# Patient Record
Sex: FEMALE | Race: WHITE
[De-identification: names, ages, dates, MRNs, and addresses within clinical notes are randomized per-mention and may not be internally consistent; named-entity substitution may affect disease eponyms.]

---

## 2019-01-27 ENCOUNTER — HOSPITAL ENCOUNTER (EMERGENCY)
Dept: HOSPITAL 14 - H.ER | Age: 44
Discharge: HOME | End: 2019-01-27
Payer: SELF-PAY

## 2019-01-27 VITALS — RESPIRATION RATE: 18 BRPM | HEART RATE: 79 BPM | DIASTOLIC BLOOD PRESSURE: 66 MMHG | SYSTOLIC BLOOD PRESSURE: 118 MMHG

## 2019-01-27 VITALS — TEMPERATURE: 97.7 F | OXYGEN SATURATION: 99 %

## 2019-01-27 DIAGNOSIS — R51: ICD-10-CM

## 2019-01-27 DIAGNOSIS — H11.30: Primary | ICD-10-CM

## 2019-01-27 NOTE — ED PDOC
HPI: Eye Injury/Pain


Time Seen by Provider: 01/27/19 11:31


Chief Complaint (Nursing): Eye Problem


Chief Complaint (Provider): Right Eye Redness 


History Per: Patient


History/Exam Limitations: no limitations


Onset/Duration Of Symptoms: Days (2)


Current Symptoms Are (Timing): Still Present


Associated Symptoms: denies: Decreased Vision, Itching, Discharge From Eye


Additional Complaint(s): 


43 year old male presents to the ED for an evaluation of redness to her right 

eye since Friday. Patient reports she has a headache. She took Motrin without r

elief. Otherwise, she denies blurry vision, drainage or foreign body. 


PMD: No family provider








Past Medical History


Reviewed: Historical Data, Nursing Documentation, Vital Signs


Vital Signs: 


                                Last Vital Signs











Temp  97.7 F   01/27/19 11:15


 


Pulse  76   01/27/19 11:15


 


Resp  14   01/27/19 11:15


 


BP  120/51 L  01/27/19 11:15


 


Pulse Ox  99   01/27/19 11:15














- Medical History


PMH: No Chronic Diseases





- Surgical History


Surgical History: Cholecystectomy





- Family History


Family History: States: Unknown Family Hx





- Social History


Current smoker - smoking cessation education provided: No


Alcohol: Social


Drugs: Denies





- Home Medications


Home Medications: 


                                Ambulatory Orders











 Medication  Instructions  Recorded


 


Acetaminophen with Codeine 1 tab PO Q6H PRN #10 tab 01/27/19





[Tylenol with Codeine No. 3 300  





mg-30 mg]  














- Allergies


Allergies/Adverse Reactions: 


                                    Allergies











Allergy/AdvReac Type Severity Reaction Status Date / Time


 


No Known Allergies Allergy   Verified 01/27/19 11:28














Review of Systems


ROS Statement: Except As Marked, All Systems Reviewed And Found Negative


Constitutional: Negative for: Fever


Eyes: Positive for: Redness.  Negative for: Vision Change


Respiratory: Negative for: Cough


Skin: Negative for: Rash


Neurological: Positive for: Headache





Physical Exam





- Reviewed


Nursing Documentation Reviewed: Yes


Vital Signs Reviewed: Yes





- Physical Exam


Appears: Positive for: Well, Non-toxic, No Acute Distress


Head Exam: Positive for: ATRAUMATIC, NORMAL INSPECTION, NORMOCEPHALIC


Skin: Positive for: Normal Color, Warm, Dry.  Negative for: Rash


Eye Exam: Positive for: Normal appearance, EOMI, PERRL, Other (subconjunctival 

hemorrhage to right medial eye)


ENT: Positive for: Normal ENT Inspection


Neck: Positive for: Normal, Painless ROM, Supple.  Negative for: Decreased ROM


Cardiovascular/Chest: Positive for: Regular Rate, Rhythm.  Negative for: Murmur


Respiratory: Positive for: Normal Breath Sounds.  Negative for: Decreased Breath

Sounds, Respiratory Distress


Gastrointestinal/Abdominal: Positive for: Normal Exam, Soft.  Negative for: 

Tenderness, Guarding, Rebound


Back: Positive for: Normal Inspection


Extremity: Positive for: Normal ROM.  Negative for: Tenderness, Pedal Edema, 

Deformity


Neurologic/Psych: Positive for: Alert, Oriented (x3).  Negative for: 

Motor/Sensory Deficits





- ECG


O2 Sat by Pulse Oximetry: 99 (RA)


Pulse Ox Interpretation: Normal





Medical Decision Making


Medical Decision Making: 


Time: 1134 


Initial Plan:


Head w/o Contrast CT 


ED urine pregnancy 


Tylenol/Codeine 300mg/30mg 


Reevaluation 





Time: 1412


PROCEDURE:  CT HEAD WITHOUT CONTRAST


FINDINGS:


HEMORRHAGE:


No intracranial hemorrhage. 


BRAIN:


No mass effect or edema.  No atrophy or chronic microvascular ischemic changes.


VENTRICLES:


Unremarkable. No hydrocephalus. 


CALVARIUM:


Unremarkable.


PARANASAL SINUSES:


Unremarkable as visualized. No significant inflammatory changes.


MASTOID AIR CELLS:


Unremarkable as visualized. No inflammatory changes.


OTHER FINDINGS:


None.


IMPRESSION:


Intracranial hemorrhage.








 

--------------------------------------------------------------------------------


----------------- 


Scribe Attestation:


Documented by Memo Olivier, acting as a scribe for Shari Covarrubias MD.





Provider Scribe Attestation:


All medical record entries made by the Scribe were at my direction and 

personally dictated by me. I have reviewed the chart and agree that the record 

accurately reflects my personal performance of the history, physical exam, 

medical decision making, and the department course for this patient. I have also

personally directed, reviewed, and agree with the discharge instructions and 

disposition.











Disposition





- Clinical Impression


Clinical Impression: 


 Subconjunctival hemorrhage, Headache








- Disposition


Referrals: 


[facility] [Outside]


Disposition: Routine/Home


Disposition Time: 14:42


Condition: STABLE


Prescriptions: 


Acetaminophen with Codeine [Tylenol with Codeine No. 3 300 mg-30 mg] 1 tab PO 

Q6H PRN #10 tab


 PRN Reason: Pain, Severe (8-10)


Instructions:  Headache, Adult, Subconjunctival Hemorrhage


Forms:  [facility] (Yoruba)


Print Language: Hungarian

## 2019-01-27 NOTE — CT
Date of service: 



2019-01-27 13:50:58



PROCEDURE:  CT HEAD WITHOUT CONTRAST.



HISTORY:

R sided HA



COMPARISON:

No for now prior available for comparison. 



TECHNIQUE:

Axial computed tomography images were obtained through the head/brain 

without intravenous contrast.  



Radiation dose:



Total exam DLP = 792.71 mGy-cm.



This CT exam was performed using one or more of the following dose 

reduction techniques: Automated exposure control, adjustment of the 

mA and/or kV according to patient size, and/or use of iterative 

reconstruction technique.



FINDINGS:



HEMORRHAGE:

No intracranial hemorrhage. 



BRAIN:

No mass effect or edema.  No atrophy or chronic microvascular 

ischemic changes.



VENTRICLES:

Unremarkable. No hydrocephalus. 



CALVARIUM:

Unremarkable.



PARANASAL SINUSES:

Unremarkable as visualized. No significant inflammatory changes.



MASTOID AIR CELLS:

Unremarkable as visualized. No inflammatory changes.



OTHER FINDINGS:

None.



IMPRESSION:

Intracranial hemorrhage.

## 2019-02-24 ENCOUNTER — HOSPITAL ENCOUNTER (EMERGENCY)
Dept: HOSPITAL 14 - H.ER | Age: 44
LOS: 1 days | Discharge: HOME | End: 2019-02-25
Payer: COMMERCIAL

## 2019-02-24 VITALS — RESPIRATION RATE: 18 BRPM

## 2019-02-24 DIAGNOSIS — R10.2: Primary | ICD-10-CM

## 2019-02-24 DIAGNOSIS — D25.9: ICD-10-CM

## 2019-02-24 LAB
BACTERIA #/AREA URNS HPF: (no result) /[HPF]
BASOPHILS # BLD AUTO: 0 K/UL (ref 0–0.2)
BASOPHILS NFR BLD: 0.3 % (ref 0–2)
BILIRUB UR-MCNC: NEGATIVE MG/DL
BUN SERPL-MCNC: 12 MG/DL (ref 7–17)
CALCIUM SERPL-MCNC: 9.2 MG/DL (ref 8.4–10.2)
COLOR UR: YELLOW
EOSINOPHIL # BLD AUTO: 0.1 K/UL (ref 0–0.7)
EOSINOPHIL NFR BLD: 1.5 % (ref 0–4)
ERYTHROCYTE [DISTWIDTH] IN BLOOD BY AUTOMATED COUNT: 14.7 % (ref 11.5–14.5)
GFR NON-AFRICAN AMERICAN: > 60
GLUCOSE UR STRIP-MCNC: (no result) MG/DL
HGB BLD-MCNC: 12.6 G/DL (ref 12–16)
LEUKOCYTE ESTERASE UR-ACNC: (no result) LEU/UL
LYMPHOCYTES # BLD AUTO: 1.7 K/UL (ref 1–4.3)
LYMPHOCYTES NFR BLD AUTO: 17.5 % (ref 20–40)
MCH RBC QN AUTO: 28.6 PG (ref 27–31)
MCHC RBC AUTO-ENTMCNC: 32.9 G/DL (ref 33–37)
MCV RBC AUTO: 86.9 FL (ref 81–99)
MONOCYTES # BLD: 0.8 K/UL (ref 0–0.8)
MONOCYTES NFR BLD: 7.8 % (ref 0–10)
NEUTROPHILS # BLD: 7 K/UL (ref 1.8–7)
NEUTROPHILS NFR BLD AUTO: 72.9 % (ref 50–75)
NRBC BLD AUTO-RTO: 0.1 % (ref 0–0)
PH UR STRIP: 6 [PH] (ref 5–8)
PLATELET # BLD: 166 K/UL (ref 130–400)
PMV BLD AUTO: 11.3 FL (ref 7.2–11.7)
PROT UR STRIP-MCNC: 30 MG/DL
RBC # BLD AUTO: 4.41 MIL/UL (ref 3.8–5.2)
RBC # UR STRIP: (no result) /UL
SP GR UR STRIP: 1.02 (ref 1–1.03)
SQUAMOUS EPITHIAL: 5 /HPF (ref 0–5)
URINE CLARITY: (no result)
UROBILINOGEN UR-MCNC: 2 MG/DL (ref 0.2–1)
WBC # BLD AUTO: 9.6 K/UL (ref 4.8–10.8)

## 2019-02-24 PROCEDURE — 96372 THER/PROPH/DIAG INJ SC/IM: CPT

## 2019-02-24 PROCEDURE — 86850 RBC ANTIBODY SCREEN: CPT

## 2019-02-24 PROCEDURE — 81025 URINE PREGNANCY TEST: CPT

## 2019-02-24 PROCEDURE — 99284 EMERGENCY DEPT VISIT MOD MDM: CPT

## 2019-02-24 PROCEDURE — 86900 BLOOD TYPING SEROLOGIC ABO: CPT

## 2019-02-24 PROCEDURE — 81003 URINALYSIS AUTO W/O SCOPE: CPT

## 2019-02-24 PROCEDURE — 85025 COMPLETE CBC W/AUTO DIFF WBC: CPT

## 2019-02-24 PROCEDURE — 87086 URINE CULTURE/COLONY COUNT: CPT

## 2019-02-24 PROCEDURE — 76830 TRANSVAGINAL US NON-OB: CPT

## 2019-02-24 PROCEDURE — 80048 BASIC METABOLIC PNL TOTAL CA: CPT

## 2019-02-24 NOTE — ED PDOC
HPI: Female  Pain


Time Seen by Provider: 02/24/19 19:08


Chief Complaint (Nursing): Female Genitourinary


Chief Complaint (Provider): vaginal bleed x 1 month


History Per: Patient


History/Exam Limitations: no limitations


Additional Complaint(s): 


44 y/o F with hx of uterine fibroids s/p myomectomy in 11/2018 who presents with

vaginal beeding x 1 month. STates that it has been on and off for the past month

but for the past week she has had a dark discharge and developed pelvic pain and

vaginal discomfort so much so that it hurts to sit on the toilet. She is having 

some dizziness but denies N/V, diarrhea. 





Past Medical History


Reviewed: Historical Data, Nursing Documentation, Vital Signs


Vital Signs: 





                                Last Vital Signs











Temp  98 F   02/24/19 18:22


 


Pulse  70   02/24/19 18:22


 


Resp  18   02/24/19 18:22


 


BP  121/67   02/24/19 18:22


 


Pulse Ox  100   02/24/19 18:22














- Medical History


Other PMH: uterine fibroids





- Surgical History


Surgical History: Cholecystectomy


Other surgeries: uterine myomectomy





- Family History


Family History: States: Unknown Family Hx





- Home Medications


Home Medications: 


                                Ambulatory Orders











 Medication  Instructions  Recorded


 


Acetaminophen with Codeine 1 tab PO Q6H PRN #10 tab 01/27/19





[Tylenol with Codeine No. 3 300  





mg-30 mg]  














- Allergies


Allergies/Adverse Reactions: 


                                    Allergies











Allergy/AdvReac Type Severity Reaction Status Date / Time


 


No Known Allergies Allergy   Verified 02/24/19 18:22














Review of Systems


Constitutional: Negative for: Fever, Chills


Cardiovascular: Negative for: Palpitations


Gastrointestinal: Negative for: Nausea, Vomiting, Abdominal Pain, Diarrhea


Genitourinary Female: Positive for: Vaginal Discharge, Vaginal Bleeding, Pelvic 

Pain.  Negative for: Dysuria, Frequency, Hematuria


Neurological: Positive for: Dizziness





Physical Exam





- Reviewed


Nursing Documentation Reviewed: Yes


Vital Signs Reviewed: Yes





- Physical Exam


Appears: Positive for: Uncomfortable


Skin: Positive for: Normal Color


Gastrointestinal/Abdominal: Positive for: Tenderness (RLQ, LLQ and suprapubic 

tenderness)


Pelvic Exam: Positive for: No Cerv. Motion Tender, Blood (dark blood noted in 

vaginal vault on speculum exam. Pt exquisitely tender on speculum exam and 

refused to allow for completion due to pain. ), Tender Adnexa (B/L on bimanual 

exam), Tender Uterus, Other (performed in the presence of ED technician Evelyn 

as chaperone. Unable to visualize cervix as pt refused to continue due to pain. 

).  Negative for: No Masses, Lesions


Neurologic/Psych: Positive for: Alert, Oriented





- Laboratory Results


Result Diagrams: 


                                 02/24/19 20:19





                                 02/24/19 20:19





- ECG


O2 Sat by Pulse Oximetry: 100





Medical Decision Making


Medical Decision Making: 


CBC, CMP, Type and screen


Transvaginal U/S


URine dip, U/A, urine culture


Toradol 30mg IM x 1





20:00: pt endorsed to [facility] pending lab results and Transvaginal U/S. 








Disposition





- Clinical Impression


Clinical Impression: 


 Genitourinary Pain








- Patient ED Disposition


Is Patient to be Admitted: Transfer of Care





- Disposition


Disposition: Transfer of Care ([facility])


Disposition Time: 20:00


Condition: FAIR


Forms:  [facility] (English)

## 2019-02-24 NOTE — ED PDOC
- Laboratory Results


Result Diagrams: 


                                 02/24/19 20:19





                                 02/24/19 20:19


Lab Results: 





                                        











Urine Color  Yellow  (YELLOW)   02/24/19  20:19    


 


Urine Clarity  Cloudy  (Clear)   02/24/19  20:19    


 


Urine pH  6.0  (5.0-8.0)   02/24/19  20:19    


 


Ur Specific Gravity  1.025  (1.003-1.030)   02/24/19  20:19    


 


Urine Protein  30 mg/dL (NEGATIVE)   02/24/19  20:19    


 


Urine Glucose (UA)  Neg mg/dL (NEGATIVE)   02/24/19  20:19    


 


Urine Ketones  Negative mg/dL (NEGATIVE)   02/24/19  20:19    


 


Urine Blood  Large  (NEGATIVE)   02/24/19  20:19    


 


Urine Nitrate  Negative  (NEGATIVE)   02/24/19  20:19    


 


Urine Bilirubin  Negative  (NEGATIVE)   02/24/19  20:19    


 


Urine Urobilinogen  2.0 mg/dL (0.2-1.0)  H  02/24/19  20:19    


 


Ur Leukocyte Esterase  Large Brandy/uL (Negative)   02/24/19  20:19    


 


Urine RBC (Auto)  1765 /hpf (0-3)  H  02/24/19  20:19    


 


Urine Microscopic WBC  372 /hpf (0-5)  H  02/24/19  20:19    


 


Ur Squamous Epith Cells  5 /hpf (0-5)   02/24/19  20:19    


 


Urine Bacteria  Rare  (<OCC)   02/24/19  20:19    














- ECG


O2 Sat by Pulse Oximetry: 100





Medical Decision Making


Medical Decision Making: 





Pt signed over from Julia Caceres at 2000





22:40


US Transvaginal


Findings 


Uterus 


Measures 8.6 x 4.8 x 6.3 cm. Normal in size. Fundal fibroid measures 2.1 x 1.2 x

1.4 cm, posterior fundal fibroid measures 2.4 x 1.6 x 1.9 cm and left anterior 

fibroid measures 1.7 x 1.6 x 1.5 cm. 


Endometrium 


Measures 9 mm in diameter. Unremarkable. 


Right ovary 


Measures 2.6 x 2 x 2.2 cm. No solid mass. Normal flow. Cyst measures 1.4 x 1.3 x

1.3 cm. 


Left ovary 


Not visualized. 


Free fluid 


No significant free fluid noted. 


Other Findings 


None. 


Impression 


1.  Uterine fibroids. 


2.  Right ovarian cyst. 


3.  Left ovary not visualized.





Disposition





- Clinical Impression


Clinical Impression: 


 Genitourinary Pain, Female genitourinary symptoms, Uterine fibroid








- POA


Present On Arrival: None





- Disposition


Referrals: 


[facility] [Outside]


[facility] [Outside]


[facility] [Outside]


[facility] [Outside[facility]


Disposition: Routine/Home


Disposition Time: 01:53


Condition: STABLE


Instructions:  Uterine Fibroids, Uterine Fibroids (DC), Uterine Fibroid Removal 

(DC)


Forms:  [facility] (English), [employer] (Maltese)


Print Language: [place] citizen

## 2019-02-25 VITALS — SYSTOLIC BLOOD PRESSURE: 128 MMHG | DIASTOLIC BLOOD PRESSURE: 74 MMHG | HEART RATE: 58 BPM | TEMPERATURE: 98 F

## 2019-02-25 VITALS — OXYGEN SATURATION: 100 %

## 2019-02-25 NOTE — US
Date of service: 



02/24/2019



HISTORY:

Pelvic pain.  Bleeding 1 month duration. 



COMPARISON:

07/21/2013 pelvic ultrasound.  Solitary anterior uterine fibroid 

measured 2.9 x 3.2 x 3 cm. 



TECHNIQUE:

Transvaginal only. Real -time technique with 2D, duplex and color 

Doppler



FINDINGS:



UTERUS:

Measures 4.8 x 6.3 x 8.6 cm. Normal in size, heterogeneous echo 

characteristics. 



1. Fundal fibroid, midline and sub serosal 2.1 x 1.2 cm. 



2. Posterior midline fibroid 1.6 x 2.4 cm. 



3. Anterior sub serosal fibroid to the left of the midline 1 x 1.6 

cm. 



ENDOMETRIUM:

Measures 9.4 mm in diameter. No ultrasound findings to suggest 

gestational sac, fluid, debris, mass or polyp or other pathologic 

process within the endometrium. 



CERVIX:

No cervical abnormality identified.



RIGHT OVARY:

Measures 2 x 2.2 x 2.6 cm. No solid mass. Normal flow. Simple cyst 

1.3 x 1.3 x 1.4 cm.



LEFT OVARY:

Not visible. 



FREE FLUID:

No significant free fluid noted.



OTHER FINDINGS:

None. 



IMPRESSION:

Heterogeneous uterus containing 3 small uterine fibroids.  The fundal 

fibroid apparent on the current study is diminished in size compared 

to the prior study.  The 2 additional fibroids were not seen on the 

prior examination. 



Simple cyst right adnexa.



Limitations of the current study: Non visible left adnexa.



___________________________________________________



Concordant findings (preliminary report) provided by [facility].

## 2019-03-24 ENCOUNTER — HOSPITAL ENCOUNTER (EMERGENCY)
Dept: HOSPITAL 14 - H.ER | Age: 44
Discharge: HOME | End: 2019-03-24
Payer: COMMERCIAL

## 2019-03-24 VITALS — OXYGEN SATURATION: 100 %

## 2019-03-24 VITALS
SYSTOLIC BLOOD PRESSURE: 129 MMHG | HEART RATE: 66 BPM | TEMPERATURE: 98.2 F | RESPIRATION RATE: 18 BRPM | DIASTOLIC BLOOD PRESSURE: 88 MMHG

## 2019-03-24 DIAGNOSIS — M25.561: ICD-10-CM

## 2019-03-24 DIAGNOSIS — M17.11: Primary | ICD-10-CM

## 2019-03-24 PROCEDURE — 81025 URINE PREGNANCY TEST: CPT

## 2019-03-24 PROCEDURE — 96372 THER/PROPH/DIAG INJ SC/IM: CPT

## 2019-03-24 PROCEDURE — 73562 X-RAY EXAM OF KNEE 3: CPT

## 2019-03-24 PROCEDURE — 99284 EMERGENCY DEPT VISIT MOD MDM: CPT

## 2019-03-24 NOTE — ED PDOC
Lower Extremity Pain/Injury


Time Seen by Provider: 19 17:53


Chief Complaint (Nursing): Lower Extremity Problem/Injury


Chief Complaint (Provider): Right Knee Pain 


History Per: Patient,  ([id number])


History/Exam Limitations: no limitations


Onset/Duration Of Symptoms: Days (x3 weeks)


Current Symptoms Are (Timing): Still Present


Pain Scale Rating Of: 10


Additional Complaint(s): 





Patient is a 43 year old female who presents to the ED for evaluation of 

atraumatic right knee pain ongoing x3 weeks however worsened this morning. Cathy

ent reports the pain is localized, 10/10, and described as sharp. Pain worsens 

with movement. Patient reports she is now limping as a result of the pain. 

Patient last took Tylenol at 10am with no relief of symptoms. No history of 

similar symptoms; no history of knee injury or surgery. Patient has no other 

complaints and denies fever.





PMD: clinic


LMP: 1 month ago





Past Medical History


Reviewed: Historical Data, Nursing Documentation, Vital Signs


Vital Signs: 





                                Last Vital Signs











Temp  98.4 F   19 17:47


 


Pulse  62   19 17:47


 


Resp  16   19 17:47


 


BP  133/79   19 17:47


 


Pulse Ox  100   19 17:47














- Medical History


PMH: No Chronic Diseases





- Surgical History


Surgical History: Cholecystectomy


Other surgeries: Tubal Ligation





- Family History


Family History: States: Unknown Family Hx





- Social History


Current smoker - smoking cessation education provided: No


Alcohol: None


Drugs: Denies





- Home Medications


Home Medications: 


                                Ambulatory Orders











 Medication  Instructions  Recorded


 


Acetaminophen with Codeine 1 tab PO Q6H PRN #10 tab 19





[Tylenol with Codeine No. 3 300  





mg-30 mg]  


 


Acetaminophen [Acetaminophen 8 650 mg PO Q8 PRN #21 tablet.er 19





Hour]  


 


Naproxen 500 mg PO BID PRN #20 tab 19














- Allergies


Allergies/Adverse Reactions: 


                                    Allergies











Allergy/AdvReac Type Severity Reaction Status Date / Time


 


No Known Allergies Allergy   Verified 19 17:47














Review of Systems


ROS Statement: Except As Marked, All Systems Reviewed And Found Negative


Musculoskeletal: Positive for: Other (knee pain - right)





Physical Exam





- Reviewed


Nursing Documentation Reviewed: Yes


Vital Signs Reviewed: Yes





- Physical Exam


Comments: 


GENERALIZED APPEARANCE:Patient is awake, alert, oriented x3 in no acute 

distress. Resting comfortably. 


SKIN: Warm, dry; (-) cyanosis.


NECK: Supple, FROM


LOWER EXTREMITY:Diffuse tenderness of right knee with limited ROM secondary to 

pain (-) effusion (-) erythema (-) warmth (-) skin break. Achilles tendon intact

and nontender. Ankle and foot: (-) injury with FROM. (-) calf tenderness. 

Sensation intact throughout. Strength to lower extremities symmetric. 


CARDIOVASCULAR: (+) distal pulse.


NEUROLOGIC: (+) distal sensation.


CHEST AND RESPIRATORY: (-) wheezing; (-) rales, (-) rhonchi; breath sounds 

equal bilaterally. Respirations nonlabored. 


HEART AND CARDIOVASCULAR: (-) irregularity





- Laboratory Results


Urine Pregnancy POC: Negative





- ECG


O2 Sat by Pulse Oximetry: 100 (RA)


Pulse Ox Interpretation: Normal





Medical Decision Making


Medical Decision Makin


Initial Impression: acute knee pain


Plan:


-Upreg


-Toradol 30mg IM


-Acetaminophen 650mg PO


-Knee XR 3 views 


-Re-evaluation 








XR reviewed, no acute disease as read by Claudia SZYMANSKI.





On re-evaluation, patient reports improvement of symptoms. On exam, patient 

remains AAOx3, in no acute distress. Vitals stable. RICE encouraged.


Lab/Diagnostic results d/w the patient in great detail. Diagnosis of acute knee 

pain/sprain, to consider OA d/w the patient. 


Based on history, exam and diagnostic results, plan will be for outpatient 

follow up with PMD/ortho. 


Patient instructed to follow-up with pmd / referral provided / the clinic  in 1-

2 days without fail. Advised to take medication as prescribed. Return to the 

emergency room at any time for any new or worsening symptoms. Patient states she

fully agrees with and understands discharge instructions. States that she agrees

with the plan and disposition. Verbalized and repeated discharge instructions 

and plan. I have given the patient opportunity to ask any additional questions.





Disposition





- Clinical Impression


Clinical Impression: 


 Acute knee pain, Knee sprain, Osteoarthritis








- Patient ED Disposition


Is Patient to be Admitted: No


Counseled Patient/Family Regarding: Studies Performed, Diagnosis, Need For 

Followup, Rx Given





- Disposition


Referrals: 


Claribel Garcia MD [Staff Provider] - 


[facility] [Outside]


Disposition: Routine/Home


Disposition Time: 19:00


Condition: STABLE


Additional Instructions: 


La atencin mdica de emergencia que recibi hoy se dirigi a beth sntomas 

agudos. Si le recetaron algn medicamento, llnelo y tmelo segn las 

indicaciones. Los sntomas pueden tardar varios yen en resolverse. Regrese al 

Departamento de Emergencias si beth sntomas empeoran, no mejoran o si tiene 

otros problemas.





Comunquese con zheng mdico dentro de 2 yen para omar nueva evaluacin y luis un 

seguimiento o llame a zahira de los mdicos / clnicas a los que ha sido referido y

 que figuran en el formulario de Informacin de visita al paciente que se 

incluye en zheng paquete de sandro. Lleve todos los documentos que le entregaron al 

momento del sandro junto con todos los medicamentos que est tomando para zheng 

visita de seguimiento. Nuestro tratamiento no puede reemplazar la atencin 

mdica continua por parte de un proveedor de atencin primaria (PCP) fuera del 

departamento de emergencias.


Prescriptions: 


Acetaminophen [Acetaminophen 8 Hour] 650 mg PO Q8 PRN #21 tablet.er


 PRN Reason: Pain, Moderate (4-7)


Naproxen 500 mg PO BID PRN #20 tab


 PRN Reason: Pain, Moderate (4-7)


Instructions:  Osteoarthritis, Knee Sprain (DC), Knee Pain


Forms:  [facility] (Lithuanian)


Print Language: Bengali





- POA


Present On Arrival: None

## 2019-03-25 NOTE — RAD
Date of service: 



03/24/2019



PROCEDURE:  Right Knee Radiographs.



HISTORY:

joint pain



COMPARISON:

None.



FINDINGS:



BONES:

Normal. No fracture. 



JOINTS:

Normal. No osteoarthritis. 



JOINT EFFUSION:

None. 



OTHER FINDINGS:

None.



IMPRESSION:

No evidence of acute pathology.

## 2019-04-20 ENCOUNTER — HOSPITAL ENCOUNTER (EMERGENCY)
Dept: HOSPITAL 14 - H.ER | Age: 44
LOS: 1 days | Discharge: HOME | End: 2019-04-21
Payer: SELF-PAY

## 2019-04-20 VITALS — RESPIRATION RATE: 16 BRPM

## 2019-04-20 DIAGNOSIS — M25.50: ICD-10-CM

## 2019-04-20 DIAGNOSIS — M79.7: ICD-10-CM

## 2019-04-20 DIAGNOSIS — M79.606: Primary | ICD-10-CM

## 2019-04-20 PROCEDURE — 99284 EMERGENCY DEPT VISIT MOD MDM: CPT

## 2019-04-20 PROCEDURE — 93971 EXTREMITY STUDY: CPT

## 2019-04-20 PROCEDURE — 81025 URINE PREGNANCY TEST: CPT

## 2019-04-20 PROCEDURE — 96372 THER/PROPH/DIAG INJ SC/IM: CPT

## 2019-04-20 PROCEDURE — 73562 X-RAY EXAM OF KNEE 3: CPT

## 2019-04-20 NOTE — ED PDOC
Lower Extremity Pain/Injury


Time Seen by Provider: 04/20/19 23:02


Chief Complaint (Nursing): Lower Extremity Problem/Injury


Chief Complaint (Provider): Lower Extremity Problem/Injury


History Per: Patient


History/Exam Limitations: no limitations


Onset/Duration Of Symptoms: Days


Current Symptoms Are (Timing): Still Present


Additional Complaint(s): 


44 y/o female with no significant PMHx presents to the ED for evaluation of body

aches. Patient reports symptoms started on March 24th when she was initially 

evaluated in this ER for complaints of leg pain. Patient states that since then,

she has been experiencing constant, chronic pain. Patient notes that since last 

visit, she saw her PMD who gave her a prescription for pain. Patient states she 

was given Percocet, Flexeril and Prenisone. Patient reports she medications did 

not help much but pain has worsened since running out of them yesterday. Patient

notes of having leg pain (right > left), lower & upper back pain, neck pain and 

right shoulder pain. Patient states pain to the right shoulder has been 

gradually worsening over the last week. Otherwise, patient reports she has not 

taken any other medications for pain relief since running out of prescription 

medications yesterday. Patient additionally denies fever, chest pain, numbness 

and weakness. 








PMD: no provider





Past Medical History


Reviewed: Historical Data, Nursing Documentation, Vital Signs


Vital Signs: 





                                Last Vital Signs











Temp  98.8 F   04/20/19 23:00


 


Pulse  69   04/20/19 23:00


 


Resp  16   04/20/19 23:00


 


BP  114/76   04/20/19 23:00


 


Pulse Ox  98   04/20/19 23:00














- Medical History


PMH: No Chronic Diseases





- Surgical History


Surgical History: Cholecystectomy


Other surgeries: tubal ligation





- Family History


Family History: States: Unknown Family Hx





- Immunization History


Hx Tetanus Toxoid Vaccination: No


Hx Influenza Vaccination: No


Hx Pneumococcal Vaccination: No





- Home Medications


Home Medications: 


                                Ambulatory Orders











 Medication  Instructions  Recorded


 


Acetaminophen with Codeine 1 tab PO Q6H PRN #10 tab 01/27/19





[Tylenol with Codeine No. 3 300  





mg-30 mg]  


 


Acetaminophen [Acetaminophen 8 650 mg PO Q8 PRN #21 tablet.er 03/24/19





Hour]  


 


Naproxen 500 mg PO BID PRN #20 tab 03/24/19














- Allergies


Allergies/Adverse Reactions: 


                                    Allergies











Allergy/AdvReac Type Severity Reaction Status Date / Time


 


No Known Allergies Allergy   Verified 03/24/19 17:47














Review of Systems


ROS Statement: Except As Marked, All Systems Reviewed And Found Negative


Constitutional: Positive for: Other (body aches)


Musculoskeletal: Positive for: Neck Pain, Shoulder Pain, Back Pain, Leg Pain





Physical Exam





- Reviewed


Nursing Documentation Reviewed: Yes


Vital Signs Reviewed: Yes





- Physical Exam


Appears: Positive for: No Acute Distress (but obese)


Head Exam: Positive for: ATRAUMATIC, NORMOCEPHALIC


Skin: Positive for: Normal Color, Warm, Dry


Eye Exam: Positive for: Normal appearance, EOMI, PERRL


Neck: Positive for: Normal, Painless ROM, Supple


Cardiovascular/Chest: Positive for: Regular Rate, Rhythm.  Negative for: Murmur


Respiratory: Positive for: Normal Breath Sounds.  Negative for: Accessory Muscle

Use, Respiratory Distress


Gastrointestinal/Abdominal: Positive for: Normal Exam, Soft.  Negative for: 

Tenderness


Back: Positive for: Other (paraspinal tenderness to palpation to the lower back)


Extremity: Positive for: Calf Tenderness (right calf tenderness to palpation).  

Negative for: Normal ROM (Limited ROM secondary to pain of the right knee. 

Normal ROM of the right shoulder), Deformity (to the bilateral knees), Swelling 

(or erythema to the bilateral knees. )


Neurological/Psych: Positive for: Awake, Alert, Oriented (x3).  Negative for: 

Motor/Sensory Deficits





- ECG


O2 Sat by Pulse Oximetry: 98 (RA)


Pulse Ox Interpretation: Normal





- Progress


Re-evaluation Time: 01:24


Condition: Re-examined, Improved





Medical Decision Making


Medical Decision Making: 


Time: 2321


Impression: Body aches, myalgia and arthralgia 


Differentials include but not limited to arthritis, rheumatoid disease and 

fibromyalgia


Rule out DVT


Plan:


-- Knee 3 Views RT XR


-- Percoet 5/325 mg 1 tab PO


-- Toradol 30 mg IM


-- US Duplex Lower Extrem Vein Right





Time: 0006


-- Right Knee XR as read by me demonstrates no acute findings. 





Time: 0013


EXAM: 


US for Deep Venous Thrombosis, right Lower Extremity. 





CLINICAL HISTORY: 


Rt leg pain x 3 weeks. 





TECHNIQUE: 


Real-time ultrasound scan of the veins of the right lower extremity with color 

Doppler flow, spectral waveform analysis and compression. 





COMPARISON: 


None provided. 





FINDINGS: 





DEEP VEINS: 


The common femoral, superficial femoral, and popliteal veins are echolucent and 

compressible. 


There is normal color Doppler flow throughout. 


The visualized calf veins appear patent. 





SUPERFICIAL VEINS: 


The visualized greater saphenous vein is patent. 





SOFT TISSUES: 


No popliteal fossa cyst or other abnormalities. 





IMPRESSION: 





No deep venous thrombosis evident on right lower extremity examination.


 


Electronically signed on Apr 21, 2019 12:13:00 AM EDT by:


Matt Fitzpatrick M.D., M.B.A., Certified By [facility]


Fellowship Trained MRI and CT Specialist


 


 

________________________________________________________________________________


________________________


Scribe Attestation:


Documented by Wilman Israel, acting as a scribe Dane Perez MD.





Provider Scribe Attestation:


All medical record entries made by the Scribe were at my direction and 

personally dictated by me. I have reviewed the chart and agree that the record 

accurately reflects my personal performance of the history, physical exam, 

medical decision making, and the department course for this patient. I have also

personally directed, reviewed, and agree with the discharge instructions and 

disposition.





Disposition





- Clinical Impression


Clinical Impression: 


 Arthralgia, Leg pain, Myalgia








- Patient ED Disposition


Is Patient to be Admitted: No


Doctor Will See Patient In The: Office


Counseled Patient/Family Regarding: Studies Performed, Diagnosis, Need For 

Followup





- Disposition


Referrals: 


Alejandro Haskins MD [Staff Provider] - 


Tai George MD [Staff Provider] - 


Disposition: Routine/Home


Disposition Time: 01:25


Condition: GOOD


Additional Instructions: 





ARTURO MARTIN, thank you for letting us take care of you today. Your 

provider was Matt Perez MD and you were treated for RIGHT LEG PAIN. The

emergency medical care you received today was directed at your acute symptoms. 

If you were prescribed any medication, please fill it and take as directed. It 

may take several days for your symptoms to resolve. Return to the Emergency 

Department if your symptoms worsen, do not improve, or if you have any other 

problems.





Please contact your doctor or call one of the physicians/clinics you have been 

referred to that are listed on the Patient Visit Information form that is 

included in your discharge packet. Bring any paperwork you were given at 

discharge with you along with any medications you are taking to your follow up 

visit. Our treatment cannot replace ongoing medical care by a primary care 

provider outside of the emergency department.





Thank you for allowing the [facility] team to be part of your care today.








If you had an X-Ray or CT scan: A Radiologist will review the ED reading if any 

change in treatment is needed we will contact you.***





Instructions:  Muscle and Bone Pain (DC)


Print Language: Guyanese

## 2019-04-21 VITALS
TEMPERATURE: 98.7 F | HEART RATE: 70 BPM | OXYGEN SATURATION: 99 % | DIASTOLIC BLOOD PRESSURE: 55 MMHG | SYSTOLIC BLOOD PRESSURE: 103 MMHG

## 2019-04-21 NOTE — RAD
Date of service: 



04/20/2019



PROCEDURE:  Right Knee Radiographs.



HISTORY:

right knee pain



COMPARISON:

None.



TECHNIQUE:

2 views obtained.



FINDINGS:



BONES:

Normal. No fracture. 



JOINTS:

No significant joint space narrowing.  Minimal spurring of the tibial 

spine region.  No loose body is seen.  No focal osteochondral defect 

is identified.  No periosteal reaction is seen. 



JOINT EFFUSION:

None. 



OTHER FINDINGS:

None.



IMPRESSION:

Unremarkable two-view examination of the right knee.

## 2019-04-21 NOTE — US
Date of service: 



04/20/2019



PROCEDURE:  Right lower extremity venous duplex Doppler. 



HISTORY:

right lower leg pain







COMPARISON:

None available.



TECHNIQUE:

Common femoral, superficial femoral, popliteal and posterior tibial 

veins were evaluated. Flow was assessed with color Doppler, 

compressibility, assessment of phasic flow and augmentation response. 



FINDINGS:



COMMON FEMORAL VEIN:

Unremarkable.



SUPERFICIAL FEMORAL VEIN:

Unremarkable.



POPLITEAL VEIN:

Unremarkable.



POSTERIOR TIBIAL VEIN:

Unremarkable.



OTHER FINDINGS:

None.



IMPRESSION:

No evidence of deep venous thrombosis in the right lower extremity.

## 2019-04-29 ENCOUNTER — HOSPITAL ENCOUNTER (EMERGENCY)
Dept: HOSPITAL 14 - H.ER | Age: 44
Discharge: HOME | End: 2019-04-29
Payer: SELF-PAY

## 2019-04-29 VITALS
SYSTOLIC BLOOD PRESSURE: 123 MMHG | OXYGEN SATURATION: 100 % | HEART RATE: 74 BPM | TEMPERATURE: 97.8 F | DIASTOLIC BLOOD PRESSURE: 70 MMHG | RESPIRATION RATE: 16 BRPM

## 2019-04-29 DIAGNOSIS — M79.7: ICD-10-CM

## 2019-04-29 DIAGNOSIS — M25.561: Primary | ICD-10-CM

## 2019-05-19 ENCOUNTER — HOSPITAL ENCOUNTER (EMERGENCY)
Dept: HOSPITAL 14 - H.ER | Age: 44
Discharge: HOME | End: 2019-05-19
Payer: SELF-PAY

## 2019-05-19 VITALS
OXYGEN SATURATION: 98 % | TEMPERATURE: 98.1 F | DIASTOLIC BLOOD PRESSURE: 51 MMHG | HEART RATE: 69 BPM | RESPIRATION RATE: 16 BRPM | SYSTOLIC BLOOD PRESSURE: 116 MMHG

## 2019-05-19 VITALS — BODY MASS INDEX: 38.8 KG/M2

## 2019-05-19 DIAGNOSIS — M79.10: ICD-10-CM

## 2019-05-19 DIAGNOSIS — N39.0: Primary | ICD-10-CM

## 2019-05-19 DIAGNOSIS — M79.7: ICD-10-CM

## 2019-05-19 LAB
BACTERIA #/AREA URNS HPF: (no result) /[HPF]
BASOPHILS # BLD AUTO: 0 K/UL (ref 0–0.2)
BASOPHILS NFR BLD: 0.4 % (ref 0–2)
BILIRUB UR-MCNC: NEGATIVE MG/DL
BUN SERPL-MCNC: 8 MG/DL (ref 7–17)
CALCIUM SERPL-MCNC: 8.7 MG/DL (ref 8.4–10.2)
COLOR UR: YELLOW
EOSINOPHIL # BLD AUTO: 0.1 K/UL (ref 0–0.7)
EOSINOPHIL NFR BLD: 1.6 % (ref 0–4)
ERYTHROCYTE [DISTWIDTH] IN BLOOD BY AUTOMATED COUNT: 14.2 % (ref 11.5–14.5)
GFR NON-AFRICAN AMERICAN: > 60
GLUCOSE UR STRIP-MCNC: (no result) MG/DL
HGB BLD-MCNC: 13.1 G/DL (ref 12–16)
LEUKOCYTE ESTERASE UR-ACNC: (no result) LEU/UL
LYMPHOCYTES # BLD AUTO: 1.6 K/UL (ref 1–4.3)
LYMPHOCYTES NFR BLD AUTO: 25.3 % (ref 20–40)
MCH RBC QN AUTO: 29 PG (ref 27–31)
MCHC RBC AUTO-ENTMCNC: 33.3 G/DL (ref 33–37)
MCV RBC AUTO: 87.2 FL (ref 81–99)
MONOCYTES # BLD: 0.5 K/UL (ref 0–0.8)
MONOCYTES NFR BLD: 8.3 % (ref 0–10)
NEUTROPHILS # BLD: 4.1 K/UL (ref 1.8–7)
NEUTROPHILS NFR BLD AUTO: 64.4 % (ref 50–75)
NRBC BLD AUTO-RTO: 0.1 % (ref 0–0)
PH UR STRIP: 6 [PH] (ref 5–8)
PLATELET # BLD: 200 K/UL (ref 130–400)
PMV BLD AUTO: 10.3 FL (ref 7.2–11.7)
PROT UR STRIP-MCNC: NEGATIVE MG/DL
RBC # BLD AUTO: 4.53 MIL/UL (ref 3.8–5.2)
RBC # UR STRIP: (no result) /UL
SP GR UR STRIP: 1.02 (ref 1–1.03)
SQUAMOUS EPITHIAL: 17 /HPF (ref 0–5)
URINE CLARITY: (no result)
UROBILINOGEN UR-MCNC: (no result) MG/DL (ref 0.2–1)
WBC # BLD AUTO: 6.3 K/UL (ref 4.8–10.8)

## 2019-05-19 PROCEDURE — 81025 URINE PREGNANCY TEST: CPT

## 2019-05-19 PROCEDURE — 85025 COMPLETE CBC W/AUTO DIFF WBC: CPT

## 2019-05-19 PROCEDURE — 80048 BASIC METABOLIC PNL TOTAL CA: CPT

## 2019-05-19 PROCEDURE — 87086 URINE CULTURE/COLONY COUNT: CPT

## 2019-05-19 PROCEDURE — 81003 URINALYSIS AUTO W/O SCOPE: CPT

## 2019-05-19 PROCEDURE — 96374 THER/PROPH/DIAG INJ IV PUSH: CPT

## 2019-05-19 PROCEDURE — 99284 EMERGENCY DEPT VISIT MOD MDM: CPT

## 2019-05-19 PROCEDURE — 70496 CT ANGIOGRAPHY HEAD: CPT

## 2019-05-19 PROCEDURE — 70498 CT ANGIOGRAPHY NECK: CPT
